# Patient Record
Sex: FEMALE | Race: OTHER | NOT HISPANIC OR LATINO | URBAN - METROPOLITAN AREA
[De-identification: names, ages, dates, MRNs, and addresses within clinical notes are randomized per-mention and may not be internally consistent; named-entity substitution may affect disease eponyms.]

---

## 2018-07-19 ENCOUNTER — OUTPATIENT (OUTPATIENT)
Dept: OUTPATIENT SERVICES | Facility: HOSPITAL | Age: 63
LOS: 1 days | Discharge: HOME | End: 2018-07-19
Payer: COMMERCIAL

## 2018-07-19 VITALS
HEART RATE: 70 BPM | TEMPERATURE: 98 F | WEIGHT: 147.71 LBS | RESPIRATION RATE: 20 BRPM | OXYGEN SATURATION: 97 % | HEIGHT: 59 IN | SYSTOLIC BLOOD PRESSURE: 144 MMHG | DIASTOLIC BLOOD PRESSURE: 71 MMHG

## 2018-07-19 VITALS
DIASTOLIC BLOOD PRESSURE: 75 MMHG | SYSTOLIC BLOOD PRESSURE: 143 MMHG | OXYGEN SATURATION: 95 % | HEART RATE: 78 BPM | RESPIRATION RATE: 16 BRPM

## 2018-07-19 DIAGNOSIS — Z86.018 PERSONAL HISTORY OF OTHER BENIGN NEOPLASM: Chronic | ICD-10-CM

## 2018-07-19 DIAGNOSIS — N75.0 CYST OF BARTHOLIN'S GLAND: Chronic | ICD-10-CM

## 2018-07-19 DIAGNOSIS — D17.9 BENIGN LIPOMATOUS NEOPLASM, UNSPECIFIED: Chronic | ICD-10-CM

## 2018-07-19 DIAGNOSIS — Z90.89 ACQUIRED ABSENCE OF OTHER ORGANS: Chronic | ICD-10-CM

## 2018-07-19 PROCEDURE — 57240 ANTERIOR COLPORRHAPHY: CPT

## 2018-07-19 PROCEDURE — 86901 BLOOD TYPING SEROLOGIC RH(D): CPT

## 2018-07-19 PROCEDURE — 86850 RBC ANTIBODY SCREEN: CPT

## 2018-07-19 PROCEDURE — 58558 HYSTEROSCOPY BIOPSY: CPT

## 2018-07-19 PROCEDURE — 88305 TISSUE EXAM BY PATHOLOGIST: CPT

## 2018-07-19 PROCEDURE — 86900 BLOOD TYPING SEROLOGIC ABO: CPT

## 2018-07-19 PROCEDURE — 88302 TISSUE EXAM BY PATHOLOGIST: CPT

## 2018-07-19 RX ORDER — MORPHINE SULFATE 50 MG/1
4 CAPSULE, EXTENDED RELEASE ORAL
Qty: 0 | Refills: 0 | Status: DISCONTINUED | OUTPATIENT
Start: 2018-07-19 | End: 2018-07-19

## 2018-07-19 RX ORDER — KETOROLAC TROMETHAMINE 30 MG/ML
30 SYRINGE (ML) INJECTION ONCE
Qty: 0 | Refills: 0 | Status: DISCONTINUED | OUTPATIENT
Start: 2018-07-19 | End: 2018-07-19

## 2018-07-19 RX ORDER — ONDANSETRON 8 MG/1
4 TABLET, FILM COATED ORAL ONCE
Qty: 0 | Refills: 0 | Status: DISCONTINUED | OUTPATIENT
Start: 2018-07-19 | End: 2018-07-19

## 2018-07-19 NOTE — PATIENT PROFILE ADULT. - PSH
Bartholin cyst  x2  H/O lipoma  resection posterior right knee  History of tonsillectomy    Lipoma  right axilla

## 2018-07-19 NOTE — BRIEF OPERATIVE NOTE - OPERATION/FINDINGS
anterior midline cystocele. on hysteroscopy ostea visualized, normal uterine cavity, possible anterior submucosal fibroid, small

## 2018-07-19 NOTE — BRIEF OPERATIVE NOTE - PROCEDURE
<<-----Click on this checkbox to enter Procedure Cystocele repair  07/19/2018    Active  KAYLENE  Hysteroscopy with dilation and curettage of uterus  07/19/2018    Active  KAYLENE

## 2018-07-21 LAB — SURGICAL PATHOLOGY STUDY: SIGNIFICANT CHANGE UP
